# Patient Record
Sex: FEMALE | Race: WHITE | NOT HISPANIC OR LATINO | URBAN - METROPOLITAN AREA
[De-identification: names, ages, dates, MRNs, and addresses within clinical notes are randomized per-mention and may not be internally consistent; named-entity substitution may affect disease eponyms.]

---

## 2023-01-30 ENCOUNTER — EMERGENCY (EMERGENCY)
Facility: HOSPITAL | Age: 28
LOS: 1 days | Discharge: HOME | End: 2023-01-30
Attending: EMERGENCY MEDICINE | Admitting: EMERGENCY MEDICINE
Payer: COMMERCIAL

## 2023-01-30 VITALS
TEMPERATURE: 99 F | DIASTOLIC BLOOD PRESSURE: 62 MMHG | SYSTOLIC BLOOD PRESSURE: 99 MMHG | RESPIRATION RATE: 18 BRPM | OXYGEN SATURATION: 100 % | HEART RATE: 67 BPM

## 2023-01-30 VITALS
SYSTOLIC BLOOD PRESSURE: 92 MMHG | OXYGEN SATURATION: 100 % | DIASTOLIC BLOOD PRESSURE: 62 MMHG | WEIGHT: 149.91 LBS | HEIGHT: 72 IN | RESPIRATION RATE: 18 BRPM | TEMPERATURE: 98 F | HEART RATE: 95 BPM

## 2023-01-30 LAB
ALBUMIN SERPL ELPH-MCNC: 3.8 G/DL — SIGNIFICANT CHANGE UP (ref 3.4–5)
ALP SERPL-CCNC: 66 U/L — SIGNIFICANT CHANGE UP (ref 40–120)
ALT FLD-CCNC: 38 U/L — SIGNIFICANT CHANGE UP (ref 12–42)
ANION GAP SERPL CALC-SCNC: 5 MMOL/L — LOW (ref 9–16)
APPEARANCE UR: CLEAR — SIGNIFICANT CHANGE UP
AST SERPL-CCNC: 28 U/L — SIGNIFICANT CHANGE UP (ref 15–37)
BACTERIA # UR AUTO: ABNORMAL /HPF
BASOPHILS # BLD AUTO: 0.02 K/UL — SIGNIFICANT CHANGE UP (ref 0–0.2)
BASOPHILS NFR BLD AUTO: 0.3 % — SIGNIFICANT CHANGE UP (ref 0–2)
BILIRUB SERPL-MCNC: 1 MG/DL — SIGNIFICANT CHANGE UP (ref 0.2–1.2)
BILIRUB UR-MCNC: NEGATIVE — SIGNIFICANT CHANGE UP
BUN SERPL-MCNC: 13 MG/DL — SIGNIFICANT CHANGE UP (ref 7–23)
CALCIUM SERPL-MCNC: 8.4 MG/DL — LOW (ref 8.5–10.5)
CHLORIDE SERPL-SCNC: 108 MMOL/L — SIGNIFICANT CHANGE UP (ref 96–108)
CO2 SERPL-SCNC: 27 MMOL/L — SIGNIFICANT CHANGE UP (ref 22–31)
COLOR SPEC: YELLOW — SIGNIFICANT CHANGE UP
CREAT SERPL-MCNC: 0.66 MG/DL — SIGNIFICANT CHANGE UP (ref 0.5–1.3)
CRP SERPL-MCNC: <2 MG/L — SIGNIFICANT CHANGE UP (ref 0–9)
DIFF PNL FLD: NEGATIVE — SIGNIFICANT CHANGE UP
EGFR: 122 ML/MIN/1.73M2 — SIGNIFICANT CHANGE UP
EOSINOPHIL # BLD AUTO: 0.01 K/UL — SIGNIFICANT CHANGE UP (ref 0–0.5)
EOSINOPHIL NFR BLD AUTO: 0.2 % — SIGNIFICANT CHANGE UP (ref 0–6)
EPI CELLS # UR: SIGNIFICANT CHANGE UP /HPF (ref 0–5)
GLUCOSE SERPL-MCNC: 106 MG/DL — HIGH (ref 70–99)
GLUCOSE UR QL: NEGATIVE — SIGNIFICANT CHANGE UP
HCG SERPL-ACNC: <1 MIU/ML — SIGNIFICANT CHANGE UP
HCG UR QL: NEGATIVE — SIGNIFICANT CHANGE UP
HCT VFR BLD CALC: 40.6 % — SIGNIFICANT CHANGE UP (ref 34.5–45)
HGB BLD-MCNC: 13.5 G/DL — SIGNIFICANT CHANGE UP (ref 11.5–15.5)
HIV 1 & 2 AB SERPL IA.RAPID: SIGNIFICANT CHANGE UP
IMM GRANULOCYTES NFR BLD AUTO: 0.3 % — SIGNIFICANT CHANGE UP (ref 0–0.9)
KETONES UR-MCNC: >=80 MG/DL
LEUKOCYTE ESTERASE UR-ACNC: NEGATIVE — SIGNIFICANT CHANGE UP
LIDOCAIN IGE QN: 104 U/L — SIGNIFICANT CHANGE UP (ref 73–393)
LYMPHOCYTES # BLD AUTO: 0.23 K/UL — LOW (ref 1–3.3)
LYMPHOCYTES # BLD AUTO: 3.9 % — LOW (ref 13–44)
MAGNESIUM SERPL-MCNC: 1.7 MG/DL — SIGNIFICANT CHANGE UP (ref 1.6–2.6)
MANUAL SMEAR VERIFICATION: SIGNIFICANT CHANGE UP
MCHC RBC-ENTMCNC: 32.1 PG — SIGNIFICANT CHANGE UP (ref 27–34)
MCHC RBC-ENTMCNC: 33.3 GM/DL — SIGNIFICANT CHANGE UP (ref 32–36)
MCV RBC AUTO: 96.7 FL — SIGNIFICANT CHANGE UP (ref 80–100)
MONOCYTES # BLD AUTO: 0.2 K/UL — SIGNIFICANT CHANGE UP (ref 0–0.9)
MONOCYTES NFR BLD AUTO: 3.4 % — SIGNIFICANT CHANGE UP (ref 2–14)
NEUTROPHILS # BLD AUTO: 5.49 K/UL — SIGNIFICANT CHANGE UP (ref 1.8–7.4)
NEUTROPHILS NFR BLD AUTO: 91.9 % — HIGH (ref 43–77)
NITRITE UR-MCNC: NEGATIVE — SIGNIFICANT CHANGE UP
NRBC # BLD: 0 /100 WBCS — SIGNIFICANT CHANGE UP (ref 0–0)
PH UR: 5.5 — SIGNIFICANT CHANGE UP (ref 5–8)
PLAT MORPH BLD: NORMAL — SIGNIFICANT CHANGE UP
PLATELET # BLD AUTO: 159 K/UL — SIGNIFICANT CHANGE UP (ref 150–400)
POTASSIUM SERPL-MCNC: 3.9 MMOL/L — SIGNIFICANT CHANGE UP (ref 3.5–5.3)
POTASSIUM SERPL-SCNC: 3.9 MMOL/L — SIGNIFICANT CHANGE UP (ref 3.5–5.3)
PROT SERPL-MCNC: 6.6 G/DL — SIGNIFICANT CHANGE UP (ref 6.4–8.2)
PROT UR-MCNC: ABNORMAL MG/DL
RBC # BLD: 4.2 M/UL — SIGNIFICANT CHANGE UP (ref 3.8–5.2)
RBC # FLD: 11.8 % — SIGNIFICANT CHANGE UP (ref 10.3–14.5)
RBC BLD AUTO: NORMAL — SIGNIFICANT CHANGE UP
RBC CASTS # UR COMP ASSIST: < 5 /HPF — SIGNIFICANT CHANGE UP
SARS-COV-2 RNA SPEC QL NAA+PROBE: SIGNIFICANT CHANGE UP
SODIUM SERPL-SCNC: 140 MMOL/L — SIGNIFICANT CHANGE UP (ref 132–145)
SP GR SPEC: >=1.03 — SIGNIFICANT CHANGE UP (ref 1–1.03)
TSH SERPL-MCNC: 0.56 UIU/ML — SIGNIFICANT CHANGE UP (ref 0.36–3.74)
UROBILINOGEN FLD QL: 0.2 E.U./DL — SIGNIFICANT CHANGE UP
WBC # BLD: 5.97 K/UL — SIGNIFICANT CHANGE UP (ref 3.8–10.5)
WBC # FLD AUTO: 5.97 K/UL — SIGNIFICANT CHANGE UP (ref 3.8–10.5)
WBC UR QL: < 5 /HPF — SIGNIFICANT CHANGE UP

## 2023-01-30 PROCEDURE — 99284 EMERGENCY DEPT VISIT MOD MDM: CPT

## 2023-01-30 RX ORDER — ONDANSETRON 8 MG/1
1 TABLET, FILM COATED ORAL
Qty: 12 | Refills: 0
Start: 2023-01-30

## 2023-01-30 RX ORDER — FAMOTIDINE 10 MG/ML
20 INJECTION INTRAVENOUS ONCE
Refills: 0 | Status: COMPLETED | OUTPATIENT
Start: 2023-01-30 | End: 2023-01-30

## 2023-01-30 RX ORDER — ONDANSETRON 8 MG/1
4 TABLET, FILM COATED ORAL ONCE
Refills: 0 | Status: COMPLETED | OUTPATIENT
Start: 2023-01-30 | End: 2023-01-30

## 2023-01-30 RX ORDER — SODIUM CHLORIDE 9 MG/ML
1000 INJECTION INTRAMUSCULAR; INTRAVENOUS; SUBCUTANEOUS ONCE
Refills: 0 | Status: COMPLETED | OUTPATIENT
Start: 2023-01-30 | End: 2023-01-30

## 2023-01-30 RX ADMIN — FAMOTIDINE 20 MILLIGRAM(S): 10 INJECTION INTRAVENOUS at 18:19

## 2023-01-30 RX ADMIN — ONDANSETRON 4 MILLIGRAM(S): 8 TABLET, FILM COATED ORAL at 18:19

## 2023-01-30 RX ADMIN — SODIUM CHLORIDE 1000 MILLILITER(S): 9 INJECTION INTRAMUSCULAR; INTRAVENOUS; SUBCUTANEOUS at 18:19

## 2023-01-30 NOTE — ED PROVIDER NOTE - OBJECTIVE STATEMENT
27-year-old female past medical history of depression, celiac disease presents from urgent care after being referred for abdominal pain and nausea/vomiting that started this morning.  Patient states that she woke up feeling very nauseated and had multiple episodes of vomiting with green/yellow emesis.  Patient also noted that soon after her vomiting started she began having feelings of urinary urgency similar to prior UTIs.  Also has mild discomfort in the suprapubic region.  Went to urgent care where they did a urine dip and did not note any leukocytes or nitrites in her urine.  Patient denies prior abdominal surgeries.

## 2023-01-30 NOTE — ED PROVIDER NOTE - NSFOLLOWUPINSTRUCTIONS_ED_ALL_ED_FT
Doing well your work-up today did not reveal any emergent findings, but did show dehydration.  It is likely that you have a viral illness causing nausea and vomiting, but you should remain vigilant for any new symptoms. Please read all handouts provided to you from the emergency department.  Seek immediate medical attention for any new/worsening signs or symptoms, including: Worsening abdominal pain, development of fever, inability to tolerate food or drink.  Please take prescribed medication as directed.        Acute Nausea and Vomiting    WHAT YOU NEED TO KNOW:    Acute means the nausea and vomiting starts suddenly, gets worse quickly, and lasts a short time. There are many possible causes of acute nausea and vomiting.    DISCHARGE INSTRUCTIONS:    Call your local emergency number (911 in the [place]) if:   •You have chest pain.      •You have severe pain or cramping in your abdomen.      •Your vision is blurred.      •You are confused, have a high fever, or a stiff neck.      •You have bright red blood coming from your rectum.      •Your vomit smells like bowel movement.      Return to the emergency department if:   •You have a severe headache or pain.      •You are dizzy, cold, and thirsty, and your eyes and mouth are dry.      •You are urinating very little or not at all.      •You are dizzy or lightheaded when you stand up.      •You see blood or material that looks like coffee grounds in your vomit.      Call your doctor if:   •You continue to vomit for more than 48 hours.      •Your nausea and vomiting does not get better or go away after you use medicine.      •You have questions or concerns about your condition or care.      Medicines: You may need any of the following:   •Medicines may be given to calm your stomach and stop your vomiting. You may also need medicines to help empty your stomach and bowels.      •Take your medicine as directed. Contact your healthcare provider if you think your medicine is not helping or if you have side effects. Tell your provider if you are allergic to any medicine. Keep a list of the medicines, vitamins, and herbs you take. Include the amounts, and when and why you take them. Bring the list or the pill bottles to follow-up visits. Carry your medicine list with you in case of an emergency.      Manage your symptoms:   •Rest as much as you can. Too much activity can make your nausea worse.      •Drink more liquids to prevent dehydration. Take small sips. Try drinks such as ginger ale, lemonade, water, or tea. Your provider may recommend that you drink an oral rehydration solution (ORS). ORS contains water, salts, and sugar that are needed to replace the lost body fluids.      •Eat smaller meals, more often. Try bland foods and avoid spices or strong flavors      •Do not drink alcohol. Alcohol may upset or irritate your stomach.      Follow up with your doctor as directed: Write down your questions so you remember to ask them during your visits.      Rehydration, Adult       Rehydration is the replacement of body fluids, salts, and minerals (electrolytes) that are lost during dehydration. Dehydration is when there is not enough water or other fluids in the body. This happens when you lose more fluids than you take in. Common causes of dehydration include:  •Not drinking enough fluids. This can occur when you are ill or doing activities that require a lot of energy, especially in hot weather.      •Conditions that cause loss of water or other fluids, such as diarrhea, vomiting, sweating, or urinating a lot.      •Other illnesses, such as fever or infection.      •Certain medicines, such as those that remove excess fluid from the body (diuretics).      Symptoms of mild or moderate dehydration may include thirst, dry lips and mouth, and dizziness. Symptoms of severe dehydration may include increased heart rate, confusion, fainting, and not urinating.    For severe dehydration, you may need to get fluids through an IV at the hospital. For mild or moderate dehydration, you can usually rehydrate at home by drinking certain fluids as told by your health care provider.      What are the risks?    Generally, rehydration is safe. However, taking in too much fluid (overhydration) can be a problem. This is rare. Overhydration can cause an electrolyte imbalance, kidney failure, or a decrease in salt (sodium) levels in the body.      Supplies needed    You will need an oral rehydration solution (ORS) if your health care provider tells you to use one. This is a drink to treat dehydration. It can be found in pharmacies and retail stores.      How to rehydrate    Fluids     Follow instructions from your health care provider for rehydration. The kind of fluid and the amount you should drink depend on your condition. In general, you should choose drinks that you prefer.•If told by your health care provider, drink an ORS.  •Make an ORS by following instructions on the package.      •Start by drinking small amounts, about ½ cup (120 mL) every 5–10 minutes.      •Slowly increase how much you drink until you have taken the amount recommended by your health care provider.      •Drink enough clear fluids to keep your urine pale yellow. If you were told to drink an ORS, finish it first, then start slowly drinking other clear fluids. Drink fluids such as:  •Water. This includes sparkling water and flavored water. Drinking only water can lead to having too little sodium in your body (hyponatremia). Follow the advice of your health care provider.      •Water from ice chips you suck on.      •Fruit juice with water you add to it (diluted).      •Sports drinks.      •Hot or cold herbal teas.      •Broth-based soups.      •Milk or milk products.          Food    Follow instructions from your health care provider about what to eat while you rehydrate. Your health care provider may recommend that you slowly begin eating regular foods in small amounts.  •Eat foods that contain a healthy balance of electrolytes, such as bananas, oranges, potatoes, tomatoes, and spinach.      •Avoid foods that are greasy or contain a lot of sugar.      In some cases, you may get nutrition through a feeding tube that is passed through your nose and into your stomach (nasogastric tube, or NG tube). This may be done if you have uncontrolled vomiting or diarrhea.      Beverages to avoid    Certain beverages may make dehydration worse. While you rehydrate, avoid drinking alcohol.      How to tell if you are recovering from dehydration    You may be recovering from dehydration if:  •You are urinating more often than before you started rehydrating.      •Your urine is pale yellow.      •Your energy level improves.      •You vomit less frequently.      •You have diarrhea less frequently.      •Your appetite improves or returns to normal.      •You feel less dizzy or less light-headed.      •Your skin tone and color start to look more normal.        Follow these instructions at home:    •Take over-the-counter and prescription medicines only as told by your health care provider.      • Do not take sodium tablets. Doing this can lead to having too much sodium in your body (hypernatremia).        Contact a health care provider if:  •You continue to have symptoms of mild or moderate dehydration, such as:  •Thirst.      •Dry lips.      •Slightly dry mouth.      •Dizziness.      •Dark urine or less urine than normal.      •Muscle cramps.        •You continue to vomit or have diarrhea.        Get help right away if you:    •Have symptoms of dehydration that get worse.      •Have a fever.      •Have a severe headache.    •Have been vomiting and the following happens:  •Your vomiting gets worse or does not go away.      •Your vomit includes blood or green matter (bile).      •You cannot eat or drink without vomiting.      •Have problems with urination or bowel movements, such as:  •Diarrhea that gets worse or does not go away.      •Blood in your stool (feces). This may cause stool to look black and tarry.      •Not urinating, or urinating only a small amount of very dark urine, within 6–8 hours.        •Have trouble breathing.      •Have symptoms that get worse with treatment.      These symptoms may represent a serious problem that is an emergency. Do not wait to see if the symptoms will go away. Get medical help right away. Call your local emergency services (911 in the [place] Do not drive yourself to the hospital.       Summary    •Rehydration is the replacement of body fluids and minerals (electrolytes) that are lost during dehydration.      •Follow instructions from your health care provider for rehydration. The kind of fluid and amount you should drink depend on your condition.      •Slowly increase how much you drink until you have taken the amount recommended by your health care provider.      •Contact your health care provider if you continue to show signs of mild or moderate dehydration.      This information is not intended to replace advice given to you by your health care provider. Make sure you discuss any questions you have with your health care provider.

## 2023-01-30 NOTE — ED PROVIDER NOTE - CLINICAL SUMMARY MEDICAL DECISION MAKING FREE TEXT BOX
27-year-old female past medical history of depression, celiac disease presents from urgent care after being referred for abdominal pain and nausea/vomiting that started this morning.  Patient states that she woke up feeling very nauseated and had multiple episodes of vomiting with green/yellow emesis.  Patient also noted that soon after her vomiting started she began having feelings of urinary urgency similar to prior UTIs.  Also has mild discomfort in the suprapubic region.  Went to urgent care where they did a urine dip and did not note any leukocytes or nitrites in her urine.  Patient denies prior abdominal surgeries. On examination patient has very mild tenderness to palpation over the suprapubic area and epigastric region.  Otherwise well-appearing.  Will obtain labs and give medications for her symptoms and reassess.  At this time, there is no indication for emergent imaging.  Patient amenable to this plan.

## 2023-01-30 NOTE — ED ADULT TRIAGE NOTE - CHIEF COMPLAINT QUOTE
Pt axo x3 sent to ER from  for evaluation of abdominal pain. Pt c/o generalized abd pain since this morning and bilious vomiting. Pt also endorses dysuria and frequency. UA at  only + for ketones. Pt denies significant PMH.

## 2023-01-30 NOTE — ED PROVIDER NOTE - PROGRESS NOTE DETAILS
Patient feeling better. Tolerating PO. Went over results. Given strict return precautions and verbalized understanding. Will send Rx for antiemetic to pharmacy.

## 2023-01-30 NOTE — ED PROVIDER NOTE - PATIENT PORTAL LINK FT
You can access the FollowMyHealth Patient Portal offered by [facility] by registering at the following website: [email or web address]. By joining [employer]’s FollowMyHealth portal, you will also be able to view your health information using other applications (apps) compatible with our system.

## 2023-01-31 DIAGNOSIS — Z88.2 ALLERGY STATUS TO SULFONAMIDES: ICD-10-CM

## 2023-01-31 DIAGNOSIS — R10.13 EPIGASTRIC PAIN: ICD-10-CM

## 2023-01-31 DIAGNOSIS — R11.2 NAUSEA WITH VOMITING, UNSPECIFIED: ICD-10-CM

## 2023-01-31 DIAGNOSIS — Z20.822 CONTACT WITH AND (SUSPECTED) EXPOSURE TO COVID-19: ICD-10-CM

## 2023-01-31 DIAGNOSIS — Z87.19 PERSONAL HISTORY OF OTHER DISEASES OF THE DIGESTIVE SYSTEM: ICD-10-CM

## 2023-01-31 DIAGNOSIS — Z88.0 ALLERGY STATUS TO PENICILLIN: ICD-10-CM

## 2023-01-31 DIAGNOSIS — F32.A DEPRESSION, UNSPECIFIED: ICD-10-CM
